# Patient Record
Sex: FEMALE | URBAN - METROPOLITAN AREA
[De-identification: names, ages, dates, MRNs, and addresses within clinical notes are randomized per-mention and may not be internally consistent; named-entity substitution may affect disease eponyms.]

---

## 2020-01-23 LAB — HBA1C MFR BLD: 5 % (ref 0–5.6)

## 2020-02-24 ENCOUNTER — AMBULATORY - HEALTHEAST (OUTPATIENT)
Dept: MULTI SPECIALTY CLINIC | Facility: CLINIC | Age: 30
End: 2020-02-24

## 2020-02-24 LAB — PAP SMEAR - HIM PATIENT REPORTED: NORMAL

## 2021-01-27 ENCOUNTER — AMBULATORY - HEALTHEAST (OUTPATIENT)
Dept: NURSING | Facility: CLINIC | Age: 31
End: 2021-01-27

## 2021-02-17 ENCOUNTER — AMBULATORY - HEALTHEAST (OUTPATIENT)
Dept: NURSING | Facility: CLINIC | Age: 31
End: 2021-02-17

## 2021-05-18 ENCOUNTER — RECORDS - HEALTHEAST (OUTPATIENT)
Dept: FAMILY MEDICINE | Facility: CLINIC | Age: 31
End: 2021-05-18

## 2021-05-18 ENCOUNTER — COMMUNICATION - HEALTHEAST (OUTPATIENT)
Dept: FAMILY MEDICINE | Facility: CLINIC | Age: 31
End: 2021-05-18

## 2021-06-14 ENCOUNTER — OFFICE VISIT - HEALTHEAST (OUTPATIENT)
Dept: FAMILY MEDICINE | Facility: CLINIC | Age: 31
End: 2021-06-14

## 2021-06-14 DIAGNOSIS — R10.13 ABDOMINAL PAIN, EPIGASTRIC: ICD-10-CM

## 2021-06-14 DIAGNOSIS — R19.8 ABNORMAL BOWEL MOVEMENT: ICD-10-CM

## 2021-06-14 RX ORDER — ESCITALOPRAM OXALATE 5 MG/1
2.5 TABLET ORAL
Status: SHIPPED | COMMUNITY
Start: 2021-01-05

## 2021-06-14 ASSESSMENT — MIFFLIN-ST. JEOR: SCORE: 1369.53

## 2021-06-16 PROBLEM — L70.9 ACNE: Status: ACTIVE | Noted: 2021-06-14

## 2021-06-17 NOTE — TELEPHONE ENCOUNTER
Called pt and left VM to schedule.  Unsure if wishes to see Dr. Robert, but she has a 2 pm this Thursday for opening.  Dr. Johnson has the most openings over the next couple of weeks.  Dr. Ulrich has next opening 6/3/21.  Please assist in scheduling when pt calls back. Thanks

## 2021-06-17 NOTE — TELEPHONE ENCOUNTER
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

## 2021-06-26 NOTE — PROGRESS NOTES
"Marissa Dsouza is a 31 y.o. female here for abdominal pain    ASSESSMENT/PLAN:   Marissa was seen today for abdominal pain.    Diagnoses and all orders for this visit:    Abdominal pain, epigastric  Abnormal bowel movement  No pain today, only happens at night. Broad differential for abdominal pain, including malabsorption, gallbladder issues (no stones on US in [place]), PUD (no consistent pain, melena, hematochezia), pancreatic insufficiency, food intolerance, celiacs, etc. No alarm symptoms, unclear etiology. Will refer to GI for further evaluation and determine if any imaging is warranted.    -     Ambulatory referral to Gastroenterology - [facility]          Return in about 2 months (around 8/14/2021) for abdominal pain if no improvement.       ======================================================    SUBJECTIVE  Marissa Dsouza is a 31 y.o. female here for abdominal pain.     After she eats, she gets a strong pain in her stomach. It's a \"constant, strong pain.\" It only happens at night after she eats, only 3 times per month. Unable to sleep. Unable to figure out trigger.   She is vegetarian, does not eat greasy foods.     Went to [place] in 12/2020.   She saw a doctor there, did an ultrasound to check for gallbladder but unable to find anything. He said the next step would be EGD.   After the pain at night, she has diarrhea and feels exhausted the next mornin after it happens.   3 times per month. Started several years ago, but it wasn't always this persistent. It used to last 30minutes or so, but now it's lasting hours.     No blood in stool.   No vomiting.   Just pain.   Takes tums, ranitidine but no improvement.   Tried to eliminate things from her diet, but unable to find any triggers.       ROS  Complete 10 point review of systems negative except as noted above in HPI    Reviewed Past Medical History, Medications, Family History and Social History in Epic and up to date with no new " "changes.    OBJECTIVE  /62   Pulse 79   Resp 16   Ht 5' 3\" (1.6 m)   Wt 150 lb (68 kg)   LMP 05/16/2021 (Approximate)   SpO2 99%   Breastfeeding No   BMI 26.57 kg/m       General: Cooperative, pleasant, in no acute distress  HEENT: PERRL, EOMI.   Resp: No respiratory distress. Clear to auscultation bilaterally. No wheezes, rales, rhonchi  Abd: Nontender, nondistended, bowel sounds present  Ext: radial/pedal pulses +2 bilaterally  MSK: Normal muscle tone  Neuro: CN II-XII intact  Skin: warm, well perfused. No rahes  Psych: Normal affect.    LABS & IMAGES   No results found for this or any previous visit.      ======================================================    Options for treatment and follow-up care were reviewed with the patient. Marissa Dsouza and/or guardian was engaged and actively involved in the decision making process. Marissa Dsouza and/or guardian verbalized understanding of the options discussed and was satisfied with the final plan.      Rikki Ulrich MD          "

## 2021-07-06 VITALS
RESPIRATION RATE: 16 BRPM | HEIGHT: 63 IN | WEIGHT: 150 LBS | BODY MASS INDEX: 26.58 KG/M2 | OXYGEN SATURATION: 99 % | SYSTOLIC BLOOD PRESSURE: 112 MMHG | DIASTOLIC BLOOD PRESSURE: 62 MMHG | HEART RATE: 79 BPM

## 2021-08-21 ENCOUNTER — HEALTH MAINTENANCE LETTER (OUTPATIENT)
Age: 31
End: 2021-08-21

## 2021-10-16 ENCOUNTER — HEALTH MAINTENANCE LETTER (OUTPATIENT)
Age: 31
End: 2021-10-16

## 2022-09-25 ENCOUNTER — HEALTH MAINTENANCE LETTER (OUTPATIENT)
Age: 32
End: 2022-09-25

## 2023-10-14 ENCOUNTER — HEALTH MAINTENANCE LETTER (OUTPATIENT)
Age: 33
End: 2023-10-14